# Patient Record
(demographics unavailable — no encounter records)

---

## 2024-10-29 NOTE — HISTORY OF PRESENT ILLNESS
[4] : 4 [Dull/Aching] : dull/aching [Intermittent] : intermittent [Meds] : meds [Nothing helps with pain getting better] : Nothing helps with pain getting better [Standing] : standing [Walking] : walking [de-identified] : 10/29/24: Pt reports that his elbow is feeling perfect. States that he is here for a new injury in his RT knee.  Pt states that he heard a pop while getting into his car on 10/08/24 and couldn't put pressure on his leg.  Has trouble pivoting his leg left and right. Pt states he took a Medrol dose pack which helped his pain.   Currently takes Excedrin bid for discomfort.   10/16/2023: pt here s/p right olecranon bursitis and right hand 4th mc fracture. Pt states bursitis has resolved. Pt has minimal pain to the right hand.   9/11/23:  Pt has pain and swelling in right elbow.  Pt has tried compression sleeve with minimal relief.  Pt injured his right hand on 8/20/23. [] : Post Surgical Visit: no [FreeTextEntry1] : RT Knee [FreeTextEntry9] : Motrin [de-identified] : none

## 2024-10-29 NOTE — IMAGING
[de-identified] : Right knee with full ROM minimal effusion no joint line ttp noted there is no ligamentous laxity Moderate ttp over the pes bursa region there is no joint line ttp noted PTF Compression is negative / Apprehension Sign is negative. Anterior / posterior skin abrasions noted with no evidence of infection (knee brace caused).  Right hip with full ROM mild pain noted on IROM strength is 5/5 to the RLE RLE is nvi.  Bilateral pelvis shows mild early oa of the hip / no acute bony pathology L4/5 fusion.  Right knee 3 view xray mild genu varus with early PTF joint OA.

## 2024-10-29 NOTE — ASSESSMENT
[FreeTextEntry1] : The patient was advised of the diagnosis. The natural history of the pathology was explained in full to the patient in layman's terms. All questions were answered. The risks and benefits of surgical and non-surgical treatment alternatives were explained in full to the patient.  Pt provided right pes bursa CSI #1 today.    Large joint injection of the right knee was performed. The indication for this procedure was pain, inflammation. The site was prepped with alcohol, ethyl chloride sprayed topically and sterile technique used. An injection of Lidocaine 3cc of 2% , Bupivacaine (Marcaine) 3cc of 0.5% , Triamcinolone (Kenalog) 2cc of 40 mg  was used.   Patient tolerated procedure well. Patient was advised to call if redness, pain or fever occur and apply ice for 15 minutes out of every hour for the next 12-24 hours as tolerated. The risks benefits, and alternatives have been discussed, and verbal consent was obtained.  Continue prn Excedrin for discomfort. recommend stop after 2 weeks.   Pt will rto in 3 weeks for f/u care.    Addendum: pt had complaints of upper and lower extremity intermittent pain, recommend Rheumatology consultation to assess for possible underlying auto-immune disorder.